# Patient Record
Sex: MALE | Race: WHITE | NOT HISPANIC OR LATINO | URBAN - METROPOLITAN AREA
[De-identification: names, ages, dates, MRNs, and addresses within clinical notes are randomized per-mention and may not be internally consistent; named-entity substitution may affect disease eponyms.]

---

## 2024-10-28 ENCOUNTER — EMERGENCY (EMERGENCY)
Facility: HOSPITAL | Age: 59
LOS: 1 days | Discharge: HOME | End: 2024-10-28
Attending: STUDENT IN AN ORGANIZED HEALTH CARE EDUCATION/TRAINING PROGRAM | Admitting: STUDENT IN AN ORGANIZED HEALTH CARE EDUCATION/TRAINING PROGRAM
Payer: COMMERCIAL

## 2024-10-28 VITALS
TEMPERATURE: 98 F | HEIGHT: 70 IN | HEART RATE: 89 BPM | OXYGEN SATURATION: 98 % | WEIGHT: 225.09 LBS | RESPIRATION RATE: 18 BRPM | SYSTOLIC BLOOD PRESSURE: 155 MMHG | DIASTOLIC BLOOD PRESSURE: 98 MMHG

## 2024-10-28 VITALS
OXYGEN SATURATION: 99 % | DIASTOLIC BLOOD PRESSURE: 88 MMHG | RESPIRATION RATE: 14 BRPM | HEART RATE: 80 BPM | SYSTOLIC BLOOD PRESSURE: 138 MMHG

## 2024-10-28 PROCEDURE — 73130 X-RAY EXAM OF HAND: CPT | Mod: 26,LT

## 2024-10-28 PROCEDURE — 99284 EMERGENCY DEPT VISIT MOD MDM: CPT | Mod: 25

## 2024-10-28 PROCEDURE — 12002 RPR S/N/AX/GEN/TRNK2.6-7.5CM: CPT

## 2024-10-28 PROCEDURE — 73130 X-RAY EXAM OF HAND: CPT

## 2024-10-28 PROCEDURE — 99283 EMERGENCY DEPT VISIT LOW MDM: CPT | Mod: 25

## 2024-10-28 RX ORDER — CEPHALEXIN 500 MG
1 CAPSULE ORAL
Qty: 10 | Refills: 0
Start: 2024-10-28 | End: 2024-11-01

## 2024-10-28 RX ORDER — CEPHALEXIN 500 MG
500 CAPSULE ORAL ONCE
Refills: 0 | Status: COMPLETED | OUTPATIENT
Start: 2024-10-28 | End: 2024-10-28

## 2024-10-28 RX ORDER — LIDOCAINE HCL 20 MG/ML
10 AMPUL (ML) INJECTION ONCE
Refills: 0 | Status: ACTIVE | OUTPATIENT
Start: 2024-10-28 | End: 2024-10-28

## 2024-10-28 RX ADMIN — Medication 500 MILLIGRAM(S): at 23:11

## 2024-10-28 NOTE — ED PROVIDER NOTE - PATIENT PORTAL LINK FT
You can access the FollowMyHealth Patient Portal offered by [facility] by registering at the following website: [email or web address]. By joining [employer]’s FollowMyHealth portal, you will also be able to view your health information using other applications (apps) compatible with our system.

## 2024-10-28 NOTE — ED ADULT TRIAGE NOTE - CHIEF COMPLAINT QUOTE
Pt was doing work at [employer] and got left 3rd digit caught in a metal grate. Pt sts the tip is hanging off, went to urgent care, was told it was too extensive to fix, needed to go to ER for further evaluation.

## 2024-10-28 NOTE — ED PROVIDER NOTE - CLINICAL SUMMARY MEDICAL DECISION MAKING FREE TEXT BOX
59M p/w distal tip laceration of L 3rd digit. VS and exam as above  DDx includes but not limited to: low concern for fracture or dislocation. Likely simple laceration  Plan: xr's, lac repair, reassess symptoms    See Progress Notes for further updates on ED Course

## 2024-10-28 NOTE — ED PROVIDER NOTE - CARE PROVIDER_API CALL
Avery Wright  Plastic Surgery  [address], [facility] [address] [facility] [address]  [place] [address]  Phone: [phone]  Fax: [phone]  Follow Up Time: Urgent

## 2024-10-28 NOTE — ED PROVIDER NOTE - NS ED ROS FT
CONST: no fevers, no chills  ENT: no sore throat  CV: no chest pain, no leg swelling  RESP: no shortness of breath, no cough  ABD: no abdominal pain, no nausea, no vomiting, no diarrhea  : no dysuria, no flank pain, no hematuria  MSK: no back pain, no extremity pain  SKIN:  +laceration

## 2024-10-28 NOTE — ED PROVIDER NOTE - OBJECTIVE STATEMENT
59M p/w laceration to distal tip of L 3rd digit after getting hand caught in a grater 59M p/w laceration to distal tip of L 3rd digit after getting hand caught in a grater. Denies any other injuries. Last tetanus was within the past 5 years

## 2024-10-28 NOTE — ED PROVIDER NOTE - PROGRESS NOTE DETAILS
laceration repaired and 5 absorbable sutures placed. keflex and bulky dressing applied by ED RN. will dc with keflex and hand surg f/u. all quests answered. will dc to home with return precautions

## 2024-10-28 NOTE — ED ADULT NURSE NOTE - CHIEF COMPLAINT QUOTE
Pt was doing work at [employer] and got left 3rd digit caught in a metal grate. Pt sts the tip is hanging off, went to urgent care, was told it was too extensive to fix, needed to go to ER for further evaluation.

## 2024-10-28 NOTE — ED PROCEDURE NOTE - PROCEDURE ADDITIONAL DETAILS
4 stitches in palmar aspect of wound and 1 stitch through nail 4 sutures placed in palmar aspect of wound (on medial and lateral sides) and 1 suture placed through nailbed on distal aspect

## 2024-10-28 NOTE — ED PROVIDER NOTE - PHYSICAL EXAMINATION
Gen: NAD, non-toxic appearing, awake alert   HEENT: normal conjunctiva, oral mucosa moist  Lung: CTAB, no respiratory distress, no wheezes/rhonchi/rales B/L, speaking in full sentences  CV: RRR  Abd: soft, NT, ND, no guarding, no rigidity, no rebound tenderness  MSK: no visible deformities, 3cm distal tip laceration of L 3rd digit on palmar aspect  Neuro: No focal sensory or motor deficits, 2+ radial pulse LUE  Skin: Warm, well perfused Gen: NAD, non-toxic appearing, awake alert   HEENT: normal conjunctiva, oral mucosa moist  Lung: CTAB, no respiratory distress, no wheezes/rhonchi/rales B/L, speaking in full sentences  CV: RRR  Abd: soft, NT, ND, no guarding, no rigidity, no rebound tenderness  MSK: 3cm distal tip laceration on palmar aspect of L 3rd digit without any bone exposed; bleeding controlled  Neuro: No focal sensory or motor deficits, 2+ radial pulse LUE  Skin: Warm, well perfused

## 2024-10-28 NOTE — ED ADULT NURSE NOTE - SUICIDE SCREENING DEPRESSION
Negative [Initial Visit ___] : an initial visit for [unfilled] [Questionnaire Received] : Patient questionnaire received [Intake Form Reviewed] : Patient intake form with past medical history, surgical history, family history and social history reviewed today

## 2024-10-28 NOTE — ED ADULT NURSE NOTE - NSFALLUNIVINTERV_ED_ALL_ED
Bed/Stretcher in lowest position, wheels locked, appropriate side rails in place/Call bell, personal items and telephone in reach/Instruct patient to call for assistance before getting out of bed/chair/stretcher/Non-slip footwear applied when patient is off stretcher/[place] to call system/Physically safe environment - no spills, clutter or unnecessary equipment/Purposeful proactive rounding/Room/bathroom lighting operational, light cord in reach

## 2024-10-28 NOTE — ED PROVIDER NOTE - NSFOLLOWUPINSTRUCTIONS_ED_ALL_ED_FT
Please take over the counter pain medications such as Motrin (600mg every 6 hours) and Tylenol (650mg every 4 hours) for pain     Follow up with the Hand Surgeon in 3-5 days. Do wound cleaning once daily and apply the dressings that were provided    Take Keflex twice daily for 5 days    Return to the Emergency Department if you have any new or worsening symptoms

## 2024-12-03 NOTE — ED ADULT NURSE NOTE - NS_SISCREENINGSR_GEN_ALL_ED
Discussed in detail with patient in clinic today.  Gave him paper copy.    Nicolás Morejon MD  
Negative

## 2025-03-09 NOTE — ED PROCEDURE NOTE - LENGTH OF LACERATION
3 Bed/Stretcher in lowest position, wheels locked, appropriate side rails in place/Call bell, personal items and telephone in reach/Instruct patient to call for assistance before getting out of bed/chair/stretcher/Non-slip footwear applied when patient is off stretcher/[place] to call system/Physically safe environment - no spills, clutter or unnecessary equipment/Purposeful proactive rounding/Room/bathroom lighting operational, light cord in reach